# Patient Record
Sex: MALE | Race: WHITE | NOT HISPANIC OR LATINO | Employment: UNEMPLOYED | URBAN - METROPOLITAN AREA
[De-identification: names, ages, dates, MRNs, and addresses within clinical notes are randomized per-mention and may not be internally consistent; named-entity substitution may affect disease eponyms.]

---

## 2017-04-10 ENCOUNTER — ALLSCRIPTS OFFICE VISIT (OUTPATIENT)
Dept: OTHER | Facility: OTHER | Age: 27
End: 2017-04-10

## 2017-04-10 DIAGNOSIS — Z13.29 ENCOUNTER FOR SCREENING FOR OTHER SUSPECTED ENDOCRINE DISORDER: ICD-10-CM

## 2017-04-10 DIAGNOSIS — Z13.220 ENCOUNTER FOR SCREENING FOR LIPOID DISORDERS: ICD-10-CM

## 2017-04-10 DIAGNOSIS — Z13.0 ENCOUNTER FOR SCREENING FOR DISEASES OF THE BLOOD AND BLOOD-FORMING ORGANS AND CERTAIN DISORDERS INVOLVING THE IMMUNE MECHANISM: ICD-10-CM

## 2017-04-10 DIAGNOSIS — Z13.1 ENCOUNTER FOR SCREENING FOR DIABETES MELLITUS: ICD-10-CM

## 2018-01-13 NOTE — PROGRESS NOTES
Assessment    1  Encounter for preventive health examination (V70 0) (Z00 00)   2  Asperger's disorder (299 80) (F84 5)   3  Asthma (493 90) (J45 909)   4  Anxiety (300 00) (F41 9)   5  Screening for diabetes mellitus (V77 1) (Z13 1)   6  Screening for lipid disorders (V77 91) (Z13 220)   7  Screening for hypothyroidism (V77 0) (Z13 29)    Plan  Anxiety, Screening for deficiency anemia, Screening for hypothyroidism    · (1) TSH; Status:Active; Requested for:12Apr2016; Health Maintenance, Screening for deficiency anemia, Screening for lipid disorders    · (1) CBC/PLT/DIFF; Status:Active; Requested for:12Apr2016;   Screening for diabetes mellitus    · (1) COMPREHENSIVE METABOLIC PANEL; Status:Active; Requested for:12Apr2016;   Screening for lipid disorders    · (1) LIPID PANEL, FASTING; Status:Active; Requested for:12Apr2016;     Discussion/Summary  Impression: health maintenance visit  Currently, he eats a healthy diet  Prostate cancer screening: PSA is not indicated  Testicular cancer screening: the risks and benefits of testicular cancer screening were discussed, self testicular exam technique was taught and the patient declines testicular cancer screening  Testing was done today for n/A patient not sexually active  Colorectal cancer screening: colorectal cancer screening is not indicated  Screening lab work includes glucose and lipid profile  The risks and benefits of immunizations were discussed  He was advised to be evaluated by an optometrist and a dentist  Advice and education were given regarding aerobic exercise, calcium supplements, vitamin D supplements, cardiovascular risk reduction, sunscreen use, helmet use and seat belt use  Patient discussion: discussed with the patient, discussed with the patient's family  Chief Complaint  PHYSICAL      History of Present Illness  HM, Adult Male: The patient is being seen for a health maintenance evaluation   The last health maintenance visit was 1 year(s) ago    Social History: Household members include mother and father  He is unmarried  Work status: currently on disability  The patient has never smoked cigarettes  He reports never drinking alcohol  He has never used illicit drugs  General Health: The patient's health since the last visit is described as good  He has regular dental visits  He denies vision problems  He denies hearing loss  Immunizations status: up to date  Lifestyle:  He consumes a diverse and healthy diet  He has weight concerns  He exercises regularly  He does not use tobacco  He denies alcohol use  He denies drug use  Reproductive health:  the patient is not sexually active  Screening:   HPI: Ana Paula is here for PE for his MA50 for services for his asperger's Patient gets services through [facility] Patient will actullay be emplyed by them now      Review of Systems    Constitutional: no fever, not feeling poorly, no chills and not feeling tired  Eyes: no eye pain and no eyesight problems  ENT: no complaints of earache, no hearing loss, no nosebleeds, no nasal discharge, no sore throat, no hoarseness  Cardiovascular: no chest pain, no intermittent leg claudication, no palpitations and no extremity edema  Respiratory: No complaints of shortness of breath, no wheezing, no cough, no SOB on exertion, no orthopnea or PND  Gastrointestinal: No complaints of abdominal pain, no constipation, no nausea or vomiting, no diarrhea or bloody stools  Musculoskeletal: no arthralgias and no myalgias  Integumentary: no rashes  Neurological: No compliants of headache, no confusion, no convulsions, no numbness or tingling, no dizziness or fainting, no limb weakness, no difficulty walking  Psychiatric: no anxiety, no sleep disturbances and no depression  Active Problems    1  Allergic rhinitis (477 9) (J30 9)   2  Anxiety (300 00) (F41 9)   3  Asperger's disorder (299 80) (F84 5)   4  Asthma (493 90) (J61 909)   5   Screening for diabetes mellitus (V77 1) (Z13 1)   6  Screening for lipid disorders (V77 91) (Z13 220)    Past Medical History    · History of Asthma (493 90) (J45 909)   · History of autism spectrum disorder (V11 8) (Z86 59)   · History of depression (V11 8) (Z86 59)   · History of Need for chickenpox vaccination (V05 4) (Z23)   · History of Need for prophylactic vaccination and inoculation against bacterial diseases  (V03 9) (Z23)   · History of Need for vaccination for DTP (V06 1) (Z23)    Surgical History    · History of Hand Incision Tendon Sheath Of A Finger   · History of Myringotomy - With Ventilating Tube Insertion    Family History    · Family history of fibromyalgia (V17 89) (Z82 69)   · Family history of low back pain (V17 89) (Z82 69)   · Family history of systemic lupus erythematosus (V19 4) (Z82 69)    · No pertinent family history    · Family history of myocardial infarction (V17 3) (Z82 49)    · Family history of Charcot's syndrome    Social History    · Denied: Alcohol Use (History)   · Denied: Caffeine Use   · Denied: Drug Use   · Never A Smoker   · Uses Safety Equipment - Seatbelts    Current Meds   1  ALPRAZolam 1 MG Oral Tablet; TAKE ONE TAB EVERY 6 TO 8 HOURS AS NEEDED; Therapy: 20BDU4822 to (Evaluate:2015); Last [id number] Ordered   2  Claritin 10 MG Oral Tablet; TAKE 1 TABLET DAILY; Therapy: (Osbaldo Adam) to Recorded   3  Flintstones Complete 60 MG Oral Tablet Chewable; CHEW AND SWALLOW ONE   TABLET ONE TIME DAILY; Therapy: 73Ske4890 to (Last Rx:33Aph9936)  Requested for: 48Esq8504 Ordered   4  Montelukast Sodium 5 MG Oral Tablet Chewable; Therapy: 27VLW2159 to Recorded   5  Multivital Oral Tablet Chewable; CHEW AND SWALLOW 1 TABLET DAILY; Therapy: 23QTD4350 to (Evaluate:94Bnz8325)  Requested for: 46APZ9358; Last   Rx:[id number] Ordered   6  Singulair 10 MG Oral Tablet; TAKE 1 TABLET DAILY; Therapy: (Osbaldo Adam) to Recorded   7   Ventolin  (90 Base) MCG/ACT Inhalation Aerosol Solution; inhale 2 puffs every 6   hrs as needed for cough and wheezing; Therapy: 09JWB5785 to (Last Rx:27Flj2977) Ordered    Allergies    1  No Known Drug Allergies    2  Pollen    Vitals   Recorded: 12Apr2016 09:57AM   Temperature 97 2 F   Heart Rate 72   Systolic 137   Diastolic 80   Height 5 ft 9 5 in   Weight 180 lb    BMI Calculated 26 2   BSA Calculated 1 99     Physical Exam    Constitutional   General appearance: No acute distress, well appearing and well nourished  Eyes   Conjunctiva and lids: No erythema, swelling or discharge  Pupils and irises: Equal, round, reactive to light  Ears, Nose, Mouth, and Throat   External inspection of ears and nose: Normal     Otoscopic examination: Tympanic membranes translucent with normal light reflex  Canals patent without erythema  Hearing: Normal     Nasal mucosa, septum, and turbinates: Normal without edema or erythema  Lips, teeth, and gums: Normal, good dentition  Oropharynx: Normal with no erythema, edema, exudate or lesions  Neck   Neck: Supple, symmetric, trachea midline, no masses  Thyroid: Normal, no thyromegaly  Pulmonary   Respiratory effort: No increased work of breathing or signs of respiratory distress  Auscultation of lungs: Clear to auscultation  Cardiovascular   Palpation of heart: Normal PMI, no thrills  Carotid pulses: 2+ bilaterally  Abdominal aorta: Normal     Femoral pulses: 2+ bilaterally  Pedal pulses: 2+ bilaterally  Examination of extremities for edema and/or varicosities: Normal     Abdomen   Abdomen: Non-tender, no masses  Liver and spleen: No hepatomegaly or splenomegaly  Lymphatic   Palpation of lymph nodes in neck: No lymphadenopathy  Palpation of lymph nodes in axillae: No lymphadenopathy  Musculoskeletal   Gait and station: Normal     Inspection/palpation of digits and nails: Normal without clubbing or cyanosis      Inspection/palpation of joints, bones, and muscles: Normal     Range of motion: Normal     Stability: Normal     Muscle strength/tone: Normal     Skin   Skin and subcutaneous tissue: Normal without rashes or lesions  Palpation of skin and subcutaneous tissue: Normal turgor  Neurologic   Cranial nerves: Cranial nerves 2-12 intact  Sensation: No sensory loss  Psychiatric   Judgment and insight: Normal     Orientation to person, place and time: Normal     Recent and remote memory: Intact  Mood and affect: Normal        Signatures   Electronically signed by : Miguel Dillon DO;  Apr 12 2016 10:22AM EST                       (Author)

## 2018-01-17 NOTE — PROGRESS NOTES
Assessment    1  Encounter for preventive health examination (V70 0) (Z00 00)   2  Asthma (493 90) (J45 909)   3  Anxiety (300 00) (F41 9)   4  Asperger's disorder (299 80) (F84 5)   5  Screening for deficiency anemia (V78 1) (Z13 0)   6  Diabetes mellitus screening (V77 1) (Z13 1)   7  Screening for hypothyroidism (V77 0) (Z13 29)   8  Screening for lipid disorders (V77 91) (Z13 220)    Plan  Diabetes mellitus screening    · (1) COMPREHENSIVE METABOLIC PANEL; Status:Active; Requested for:14Yun2122; Health Maintenance    · Multi-Vitamin Oral Tablet; Take one tablet daily   · Always use a seat belt and shoulder strap when riding or driving a motor vehicle ;  Status:Complete;   Done: 07DQL0949 03:48PM   · Begin or continue regular aerobic exercise   Gradually work up to at least 3 sessions of 30  minutes of exercise a week ; Status:Complete;   Done: 20CMU2436 03:48PM   · Brush your teeth 3 times a day and floss at least once a day ; Status:Complete;   Done:  74JHC8085 03:48PM   · Decreasing the stress in your life may help your condition improve ; Status:Complete;    Done: 59HFM3320 03:48PM   · Diets that are low in carbohydrates and high in protein are very popular for weight loss ;  Status:Complete;   Done: 77UOJ5709 03:48PM   · Drink plenty of fluids ; Status:Complete;   Done: 46GNQ3131 03:48PM   · Put a smoke detector in your living area to warn you in case of fire ; Status:Complete;    Done: 33KLU9747 03:48PM   · Regular aerobic exercise can help reduce stress ; Status:Complete;   Done: 29WPC2152  03:48PM   · There are many ways to reduce your risk of catching or spreading a sexually transmitted  Infection ; Status:Complete;   Done: 48BYS0403 03:48PM   · Use a sun block product with an SPF of 15 or more ; Status:Complete;   Done:  76IQJ4625 03:48PM   · We recommend routine visits to a dentist ; Status:Complete;   Done: 62XNZ6283 03:48PM   · We recommend that you bring your body mass index down to 26 ; Status:Complete;    Done: 03GBY1487 03:48PM   · Call [phone] if: You have any warning signs of skin cancer ; Status:Complete;    Done: 65DTP4145 03:48PM  Screening for deficiency anemia    · (1) CBC/PLT/DIFF; Status:Active; Requested for:81Ydn7148;   Screening for hypothyroidism    · (1) TSH; Status:Active; Requested for:38Kej3026;   Screening for lipid disorders    · (1) LIPID PANEL, FASTING; Status:Active; Requested for:45Wyj8467;     Discussion/Summary  Impression: health maintenance visit  Currently, he eats a healthy diet and has an adequate exercise regimen  Prostate cancer screening: PSA is not indicated  Testicular cancer screening: the risks and benefits of testicular cancer screening were discussed, self testicular exam technique was taught, the patient declines testicular cancer screening and patient refused exam  Colorectal cancer screening: colorectal cancer screening is not indicated  Screening lab work includes glucose and lipid profile  The immunizations are up to date  He was advised to be evaluated by a dentist       Chief Complaint  PHYSICAL      History of Present Illness  HM, Adult Male: The patient is being seen for a health maintenance evaluation  The last health maintenance visit was 1 year(s) ago  Social History: Household members include mother and father  He is unmarried  Work status: currently on disability  The patient has never smoked cigarettes  He reports never drinking alcohol  He has never used illicit drugs  General Health: The patient's health since the last visit is described as good  He does not have regular dental visits  He denies vision problems  He denies hearing loss  Immunizations status: up to date  Lifestyle:  He consumes a diverse and healthy diet  He has weight concerns  He exercises regularly  He does not use tobacco  He denies alcohol use  He denies drug use  Reproductive health:  the patient is not sexually active     Screening: Prostate cancer screening includes no previous evaluation  Testicular cancer screening includes monthly self testicular examinations  Colorectal cancer screening includes no previous screening  Metabolic screening includes no previous lipid profile, no previous glucose screening and no previous thyroid function test    HPI: Patient is here with his mom for PE form for his MA 50 form Patient has no new concerns He has isues with going to the dentist due to his anxiety and has not been there in awhile Patient never had his labs done also per mom due partly to anxiety He has lorazepam for that anxiety as needed prescribed by the psychiatrist Patient continues to followup with allergist for has allergies and asthma      Review of Systems    Constitutional: No fever or chills, feels well, no tiredness, no recent weight gain or weight loss  Eyes: no eye pain and no eyesight problems  ENT: no complaints of earache, no hearing loss, no nosebleeds, no nasal discharge, no sore throat, no hoarseness  Cardiovascular: No complaints of slow heart rate, no fast heart rate, no chest pain, no palpitations, no leg claudication, no lower extremity  Respiratory: No complaints of shortness of breath, no wheezing, no cough, no SOB on exertion, no orthopnea or PND  Gastrointestinal: No complaints of abdominal pain, no constipation, no nausea or vomiting, no diarrhea or bloody stools  Genitourinary: No complaints of dysuria, no incontinence, no hesitancy, no nocturia, no genital lesion, no testicular pain  Musculoskeletal: No complaints of arthralgia, no myalgias, no joint swelling or stiffness, no limb pain or swelling  Integumentary: no rashes  Neurological: No compliants of headache, no confusion, no convulsions, no numbness or tingling, no dizziness or fainting, no limb weakness, no difficulty walking  Psychiatric: anxiety and stable anxiety, but no sleep disturbances and no depression  Active Problems    1   Allergic rhinitis (477 9) (J30 9)   2  Anxiety (300 00) (F41 9)   3  Asperger's disorder (299 80) (F84 5)   4  Asthma (493 90) (J45 909)   5  Diabetes mellitus screening (V77 1) (Z13 1)   6  Screening for deficiency anemia (V78 1) (Z13 0)   7  Screening for hypothyroidism (V77 0) (Z13 29)   8  Screening for lipid disorders (V77 91) (Z13 220)    Past Medical History    · History of Asthma (493 90) (J45 909)   · History of autism spectrum disorder (V11 8) (Z86 59)   · History of depression (V11 8) (Z86 59)   · History of Need for chickenpox vaccination (V05 4) (Z23)   · History of Need for prophylactic vaccination and inoculation against bacterial diseases  (V03 9) (Z23)   · History of Need for vaccination for DTP (V06 1) (Z23)    Surgical History    · History of Hand Incision Tendon Sheath Of A Finger   · History of Myringotomy - With Ventilating Tube Insertion    Family History  Mother    · Family history of fibromyalgia (V17 89) (Z82 69)   · Family history of low back pain (V17 89) (Z82 69)   · Family history of systemic lupus erythematosus (V19 4) (Z82 69)  Father    · No pertinent family history  Maternal Grandmother    · Family history of myocardial infarction (V17 3) (Z82 49)  Maternal Grandfather    · Family history of Charcot's syndrome    Social History    · Denied: Alcohol Use (History)   · Denied: Caffeine Use   · Denied: Drug Use   · Never A Smoker   · Uses Safety Equipment - Seatbelts    Current Meds   1  ALPRAZolam 1 MG Oral Tablet; TAKE ONE TAB EVERY 6 TO 8 HOURS AS NEEDED; Therapy: 49USE3379 to (Evaluate:04Jun2015); Last IY:[id number] Ordered   2  Claritin 10 MG Oral Tablet; TAKE 1 TABLET DAILY; Therapy: (Fabi John) to Recorded   3  Ventolin  (90 Base) MCG/ACT Inhalation Aerosol Solution; inhale 2 puffs every 6   hrs as needed for cough and wheezing; Therapy: 72VWF8069 to (Last Rx:99Hhw4784) Ordered    Allergies    1  No Known Drug Allergies    2   Pollen    Vitals   Recorded: 97Eql1540 01: 90WI   Systolic 798   Diastolic 82   Weight 661 lb 4 oz   BMI Calculated 25 36   BSA Calculated 1 96     Physical Exam    Constitutional   General appearance: No acute distress, well appearing and well nourished  Head and Face   Head and face: Normal     Palpation of the face and sinuses: No sinus tenderness  Eyes   Conjunctiva and lids: No erythema, swelling or discharge  Pupils and irises: Equal, round, reactive to light  Ears, Nose, Mouth, and Throat   External inspection of ears and nose: Normal     Otoscopic examination: Tympanic membranes translucent with normal light reflex  Canals patent without erythema  Hearing: Normal     Nasal mucosa, septum, and turbinates: Normal without edema or erythema  Lips, teeth, and gums: Normal, good dentition  Oropharynx: Normal with no erythema, edema, exudate or lesions  Neck   Neck: Supple, symmetric, trachea midline, no masses  Thyroid: Normal, no thyromegaly  Pulmonary   Auscultation of lungs: Clear to auscultation  Cardiovascular   Auscultation of heart: Normal rate and rhythm, normal S1 and S2, no murmurs  Carotid pulses: 2+ bilaterally  Abdomen   Abdomen: Non-tender, no masses  Liver and spleen: No hepatomegaly or splenomegaly  Lymphatic   Palpation of lymph nodes in neck: No lymphadenopathy  Musculoskeletal   Gait and station: Normal     Skin   Skin and subcutaneous tissue: Normal without rashes or lesions  Neurologic   Cranial nerves: Cranial nerves 2-12 intact  Coordination: Normal finger to nose and heel to shin  Psychiatric   Judgment and insight: Normal     Orientation to person, place and time: Normal     Recent and remote memory: Intact  Mood and affect: Normal        Signatures   Electronically signed by : Rony Langley DO;  Apr 10 2017  3:48PM EST                       (Author)

## 2018-01-22 VITALS
WEIGHT: 174.25 LBS | BODY MASS INDEX: 25.73 KG/M2 | SYSTOLIC BLOOD PRESSURE: 128 MMHG | DIASTOLIC BLOOD PRESSURE: 82 MMHG

## 2018-04-09 ENCOUNTER — OFFICE VISIT (OUTPATIENT)
Dept: FAMILY MEDICINE CLINIC | Facility: CLINIC | Age: 28
End: 2018-04-09
Payer: MEDICARE

## 2018-04-09 VITALS
BODY MASS INDEX: 27.35 KG/M2 | TEMPERATURE: 97 F | SYSTOLIC BLOOD PRESSURE: 138 MMHG | HEART RATE: 72 BPM | WEIGHT: 185.2 LBS | DIASTOLIC BLOOD PRESSURE: 84 MMHG

## 2018-04-09 DIAGNOSIS — Z13.29 SCREENING FOR THYROID DISORDER: ICD-10-CM

## 2018-04-09 DIAGNOSIS — J30.9 ALLERGIC RHINITIS, UNSPECIFIED SEASONALITY, UNSPECIFIED TRIGGER: ICD-10-CM

## 2018-04-09 DIAGNOSIS — R03.0 ELEVATED BLOOD-PRESSURE READING WITHOUT DIAGNOSIS OF HYPERTENSION: ICD-10-CM

## 2018-04-09 DIAGNOSIS — Z13.220 SCREENING, LIPID: ICD-10-CM

## 2018-04-09 DIAGNOSIS — F41.9 ANXIETY: ICD-10-CM

## 2018-04-09 DIAGNOSIS — Z13.1 SCREENING FOR DIABETES MELLITUS: ICD-10-CM

## 2018-04-09 DIAGNOSIS — J45.20 MILD INTERMITTENT ASTHMA WITHOUT COMPLICATION: ICD-10-CM

## 2018-04-09 DIAGNOSIS — Z00.00 WELL ADULT EXAM: Primary | ICD-10-CM

## 2018-04-09 DIAGNOSIS — F84.5 ASPERGER'S DISORDER: ICD-10-CM

## 2018-04-09 DIAGNOSIS — Z13.0 SCREENING FOR DEFICIENCY ANEMIA: ICD-10-CM

## 2018-04-09 PROCEDURE — 99214 OFFICE O/P EST MOD 30 MIN: CPT | Performed by: FAMILY MEDICINE

## 2018-04-09 RX ORDER — ALBUTEROL SULFATE 90 UG/1
2 AEROSOL, METERED RESPIRATORY (INHALATION) EVERY 6 HOURS PRN
COMMUNITY
Start: 2014-07-02

## 2018-04-09 RX ORDER — ALPRAZOLAM 1 MG/1
1 TABLET ORAL
COMMUNITY

## 2018-04-09 NOTE — ASSESSMENT & PLAN NOTE
Discussed dietary and lifestyle changes Patient will decrease salt He does nto drink alcohol or caffeine I encouraged daily exercise Nurse blood pressure check in 3 months

## 2018-04-09 NOTE — ASSESSMENT & PLAN NOTE
Patient adacel vaccine up to date Recommended yearly flu shto Patient is turning 29 this year I will order screening labs Patient encouraged to exercise and continue healthy diet

## 2018-04-09 NOTE — PATIENT INSTRUCTIONS
Low-Sodium Diet   WHAT YOU NEED TO KNOW:   What is a low-sodium diet? A low-sodium diet limits foods that are high in sodium (salt)  You will need to follow a low-sodium diet if you have high blood pressure, kidney disease, or heart failure  You may also need to follow this diet if you have a condition that is causing your body to retain (hold) extra fluid  You may need to limit the amount of sodium you eat to 1,500 mg  Ask your healthcare provider how much sodium you can have each day  How can I use food labels to choose foods that are low in sodium? Read food labels to find the amount of sodium they contain  The amount of sodium is listed in milligrams (mg)  The % Daily Value (DV) column tells you how much of your daily needs are met by 1 serving of the food for each nutrient listed  Choose foods that have less than 5% of the DV of sodium  These foods are considered low in sodium  Foods that have 20% or more of the DV of sodium are considered high in sodium  Some food labels may also list any of the following terms that tell you about the sodium content in the food:  · Sodium-free:  Less than 5 mg in each serving    · Very low sodium:  35 mg of sodium or less in each serving    · Low sodium:  140 mg of sodium or less in each serving    · Reduced sodium: At least 25% less sodium in each serving than the regular type    · Light in sodium:  50% less sodium in each serving    · Unsalted or no added salt:  No extra salt is added during processing (the food may still contain sodium)  Which foods should I avoid? Salty foods are high in sodium   You should avoid the following:  · Processed foods:      ¨ Mixes for cornbread, biscuits, cake, and pudding     ¨ Instant foods, such as potatoes, cereals, noodles, and rice     ¨ Packaged foods, such as bread stuffing, rice and pasta mixes, snack dip mixes, and macaroni and cheese     ¨ Canned foods, such as canned vegetables, soups, broths, sauces, and vegetable or tomato juice    ¨ Snack foods, such as salted chips, popcorn, pretzels, pork rinds, salted crackers, and salted nuts    ¨ Frozen foods, such as dinners, entrees, vegetables with sauces, and breaded meats    ¨ Sauerkraut, pickled vegetables, and other foods prepared in brine    · Meats and cheeses:      ¨ Smoked or cured meat, such as corned beef, weems, ham, hot dogs, and sausage    ¨ Canned meats or spreads, such as potted meats, sardines, anchovies, and imitation seafood    ¨ Deli or lunch meats, such as bologna, ham, turkey, and roast beef    ¨ Processed cheese, such as American cheese and cheese spreads    · Condiments, sauces, and seasonings:      ¨ Salt (¼ teaspoon of salt contains 575 mg of sodium)    ¨ Seasonings made with salt, such as garlic salt, celery salt, onion salt, and seasoned salt    ¨ Regular soy sauce, barbecue sauce, teriyaki sauce, steak sauce, Worcestershire sauce, and most flavored vinegars    ¨ Canned gravy and mixes     ¨ Regular condiments, such as mustard, ketchup, and salad dressings    ¨ Pickles and olives    ¨ Meat tenderizers and monosodium glutamate (MSG)  Which foods can I include? Read the food label to find the amount of sodium in each serving  · Bread and cereal:  Try to choose breads with less than 80 mg of sodium per serving  ¨ Bread, roll, amber, tortilla, or unsalted crackers  ¨ Ready-to-eat cereals with less than 5% DV of sodium (examples include shredded wheat and puffed rice)    ¨ Pasta    · Vegetables and fruits:      ¨ Unsalted fresh, frozen, or canned vegetables    ¨ Fresh, frozen, or canned fruits    ¨ Fruit juice    · Dairy:  One serving has about 150 mg of sodium  ¨ Milk, all types    ¨ Yogurt    ¨ Hard cheese, such as cheddar, Swiss, [employer], or mozzarella    · Meat and other protein foods:  Some raw meats may have added sodium       ¨ Plain meats, fish, and poultry     ¨ Egg    · Other foods:      ¨ Homemade pudding    ¨ Unsalted nuts, popcorn, or pretzels    ¨ Unsalted butter or margarine  What are some ways that I can decrease sodium? · Add spices and herbs to foods instead of salt during cooking  Use salt-free seasonings to add flavor to foods  Examples include onion powder, garlic powder, basil, de oliveira powder, paprika, and parsley  Try lemon or lime juice or vinegar to give foods a tart flavor  Use hot peppers, pepper, or cayenne pepper to add a spicy flavor to foods  · Do not keep a salt shaker at your kitchen table  This may help keep you from adding salt to food at the table  It may take time to get used to enjoying the natural flavor of food instead of adding salt  Talk to your healthcare provider before you use salt substitutes  Some salt substitutes have a high amount of potassium and need to be avoided if you have kidney disease  · Choose low-sodium foods at restaurants  Meals from restaurants are often high in sodium  Some restaurants have nutrition information on the menu that tells you the amount of sodium in their foods  If possible, ask for your food to be prepared with less, or no salt  · Shop for unsalted or low-sodium foods and snacks at the grocery store  Examples include unsalted or low-sodium broths, soups, and canned vegetables  Choose fresh or frozen vegetables instead  Choose unsalted nuts or seeds or fresh fruits or vegetables as snacks  Read food labels and choose salt-free, very low-sodium, or low-sodium foods  CARE AGREEMENT:   You have the right to help plan your care  Discuss treatment options with your caregivers to decide what care you want to receive  You always have the right to refuse treatment  The above information is an  only  It is not intended as medical advice for individual conditions or treatments  Talk to your doctor, nurse or pharmacist before following any medical regimen to see if it is safe and effective for you    © 2017 [employer] Information is for End User's use only and may not be sold, redistributed or otherwise used for commercial purposes  All illustrations and images included in CareNotes® are the copyrighted property of A D A M [employer] Inc  or [address]  Wellness Visit for Adults   WHAT YOU NEED TO KNOW:   What is a wellness visit? A wellness visit is when you see your healthcare provider to get screened for health problems  You can also get advice on how to stay healthy  Write down your questions so you remember to ask them  Ask your healthcare provider how often you should have a wellness visit  What happens at a wellness visit? Your healthcare provider will ask about your health, and your family history of health problems  This includes high blood pressure, heart disease, and cancer  He or she will ask if you have symptoms that concern you, if you smoke, and about your mood  You may also be asked about your intake of medicines, supplements, food, and alcohol  Any of the following may be done:  · Your weight  will be checked  Your height may also be checked so your body mass index (BMI) can be calculated  Your BMI shows if you are at a healthy weight  · Your blood pressure  and heart rate will be checked  Your temperature may also be checked  · Blood and urine tests  may be done  Blood tests may be done to check your cholesterol levels  Abnormal cholesterol levels increase your risk for heart disease and stroke  You may also need a blood or urine test to check for diabetes if you are at increased risk  Urine tests may be done to look for signs of an infection or kidney disease  · A physical exam  includes checking your heartbeat and lungs with a stethoscope  Your healthcare provider may also check your skin to look for sun damage  · Screening tests  may be recommended  A screening test is done to check for diseases that may not cause symptoms   The screening tests you may need depend on your age, gender, family history, and lifestyle habits  For example, colorectal screening may be recommended if you are 48years old or older  What screening tests do I need if I am a woman? · A Pap smear  is used to screen for cervical cancer  Pap smears are usually done every 3 to 5 years depending on your age  You may need them more often if you have had abnormal Pap smear test results in the past  Ask your healthcare provider how often you should have a Pap smear  · A mammogram  is an x-ray of your breasts to screen for breast cancer  Experts recommend mammograms every 2 years starting at age 48 years  You may need a mammogram at age 52 years or younger if you have an increased risk for breast cancer  Talk to your healthcare provider about when you should start having mammograms and how often you need them  What vaccines might I need? · Get an influenza vaccine  every year  The influenza vaccine protects you from the flu  Several types of viruses cause the flu  The viruses change over time, so new vaccines are made each year  · Get a tetanus-diphtheria (Td) booster vaccine  every 10 years  This vaccine protects you against tetanus and diphtheria  Tetanus is a severe infection that may cause painful muscle spasms and lockjaw  Diphtheria is a severe bacterial infection that causes a thick covering in the back of your mouth and throat  · Get a human papillomavirus (HPV) vaccine  if you are female and aged 23 to 32 or male 23 to 24 and never received it  This vaccine protects you from HPV infection  HPV is the most common infection spread by sexual contact  HPV may also cause vaginal, penile, and anal cancers  · Get a pneumococcal vaccine  if you are aged 72 years or older  The pneumococcal vaccine is an injection given to protect you from pneumococcal disease  Pneumococcal disease is an infection caused by pneumococcal bacteria  The infection may cause pneumonia, meningitis, or an ear infection      · Get a shingles vaccine  if you are aged 61 or older, even if you have had shingles before  The shingles vaccine is an injection to protect you from the varicella-zoster virus  This is the same virus that causes chickenpox  Shingles is a painful rash that develops in people who had chickenpox or have been exposed to the virus  How can I eat healthy? My Plate is a model for planning healthy meals  It shows the types and amounts of foods that should go on your plate  Fruits and vegetables make up about half of your plate, and grains and protein make up the other half  A serving of dairy is included on the side of your plate  The amount of calories and serving sizes you need depends on your age, gender, weight, and height  Examples of healthy foods are listed below:  · Eat a variety of vegetables  such as dark green, red, and orange vegetables  You can also include canned vegetables low in sodium (salt) and frozen vegetables without added butter or sauces  · Eat a variety of fresh fruits , canned fruit in 100% juice, frozen fruit, and dried fruit  · Include whole grains  At least half of the grains you eat should be whole grains  Examples include whole-wheat bread, wheat pasta, brown rice, and whole-grain cereals such as oatmeal     · Eat a variety of protein foods such as seafood (fish and shellfish), lean meat, and poultry without skin (turkey and chicken)  Examples of lean meats include pork leg, shoulder, or tenderloin, and beef round, sirloin, tenderloin, and extra lean ground beef  Other protein foods include eggs and egg substitutes, beans, peas, soy products, nuts, and seeds  · Choose low-fat dairy products such as skim or 1% milk or low-fat yogurt, cheese, and cottage cheese  · Limit unhealthy fats  such as butter, hard margarine, and shortening  How much exercise do I need? Exercise at least 30 minutes per day on most days of the week  Some examples of exercise include walking, biking, dancing, and swimming   You can also fit in more physical activity by taking the stairs instead of the elevator or parking farther away from stores  Include muscle strengthening activities 2 days each week  Regular exercise provides many health benefits  It helps you manage your weight, and decreases your risk for type 2 diabetes, heart disease, stroke, and high blood pressure  Exercise can also help improve your mood  Ask your healthcare provider about the best exercise plan for you  What are some general health and safety guidelines I should follow? · Do not smoke  Nicotine and other chemicals in cigarettes and cigars can cause lung damage  Ask your healthcare provider for information if you currently smoke and need help to quit  E-cigarettes or smokeless tobacco still contain nicotine  Talk to your healthcare provider before you use these products  · Limit alcohol  A drink of alcohol is 12 ounces of beer, 5 ounces of wine, or 1½ ounces of liquor  · Lose weight, if needed  Being overweight increases your risk of certain health conditions  These include heart disease, high blood pressure, type 2 diabetes, and certain types of cancer  · Protect your skin  Do not sunbathe or use tanning beds  Use sunscreen with a SPF 15 or higher  Apply sunscreen at least 15 minutes before you go outside  Reapply sunscreen every 2 hours  Wear protective clothing, hats, and sunglasses when you are outside  · Drive safely  Always wear your seatbelt  Make sure everyone in your car wears a seatbelt  A seatbelt can save your life if you are in an accident  Do not use your cell phone when you are driving  This could distract you and cause an accident  Pull over if you need to make a call or send a text message  · Practice safe sex  Use latex condoms if are sexually active and have more than one partner  Your healthcare provider may recommend screening tests for sexually transmitted infections (STIs)      · Wear helmets, lifejackets, and protective gear   Always wear a helmet when you ride a bike or motorcycle, go skiing, or play sports that could cause a head injury  Wear protective equipment when you play sports  Wear a lifejacket when you are on a boat or doing water sports  CARE AGREEMENT:   You have the right to help plan your care  Learn about your health condition and how it may be treated  Discuss treatment options with your caregivers to decide what care you want to receive  You always have the right to refuse treatment  The above information is an  only  It is not intended as medical advice for individual conditions or treatments  Talk to your doctor, nurse or pharmacist before following any medical regimen to see if it is safe and effective for you  © 2017 [address]  Information is for End User's use only and may not be sold, redistributed or otherwise used for commercial purposes  All illustrations and images included in CareNotes® are the copyrighted property of A D A M , Inc  or [employer]

## 2018-04-09 NOTE — PROGRESS NOTES
Assessment/Plan:    Allergic rhinitis  Allergies stable continue as needed loratidine    Mild intermittent asthma without complication  Asthma is stable  Patient has not needed the inhaler in several months    Asperger's disorder  Patient is stable Patient is getting services from [address] [place], [place] [address]n is stable He take alprazolam for dental visits and sees psychiatry He will continue that    Well adult exam  Patient adacel vaccine up to date Recommended yearly flu shto Patient is turning 29 this year I will order screening labs Patient encouraged to exercise and continue healthy diet    Elevated blood-pressure reading without diagnosis of hypertension  Discussed dietary and lifestyle changes Patient will decrease salt He does nto drink alcohol or caffeine I encouraged daily exercise Nurse blood pressure check in 3 months       Diagnoses and all orders for this visit:    Well adult exam    Asperger's disorder    Anxiety    Mild intermittent asthma without complication    Allergic rhinitis, unspecified seasonality, unspecified trigger    Elevated blood-pressure reading without diagnosis of hypertension    Other orders  -     albuterol (VENTOLIN HFA) 90 mcg/act inhaler; Inhale 2 puffs every 6 (six) hours as needed  -     Multiple Vitamin (MULTI-VITAMIN DAILY PO); Take 1 tablet by mouth daily          Subjective:   Chief Complaint   Patient presents with    Physical Exam          Patient ID: Tammie Maldonado is a 33 y o  male  HPI    The following portions of the patient's history were reviewed and updated as appropriate: allergies, current medications, past social history and problem list     Review of Systems   Constitutional: Negative for fatigue, fever and unexpected weight change  HENT: Negative for congestion, sinus pain and trouble swallowing  Eyes: Negative for discharge and visual disturbance  Respiratory: Negative for cough, chest tightness, shortness of breath and wheezing  Cardiovascular: Negative for chest pain, palpitations and leg swelling  Gastrointestinal: Negative for abdominal pain, blood in stool, constipation, diarrhea, nausea and vomiting  Genitourinary: Negative for difficulty urinating, dysuria, frequency and hematuria  Musculoskeletal: Negative for arthralgias, gait problem and joint swelling  Skin: Negative for rash and wound  Allergic/Immunologic: Negative for environmental allergies and food allergies  Neurological: Negative for dizziness, syncope, weakness, numbness and headaches  Hematological: Negative for adenopathy  Does not bruise/bleed easily  Psychiatric/Behavioral: Negative for confusion, decreased concentration and sleep disturbance  The patient is not nervous/anxious  Objective:      /98   Wt 84 kg (185 lb 3 2 oz)   BMI 27 35 kg/m²          Physical Exam   Constitutional: He is oriented to person, place, and time  He appears well-developed and well-nourished  HENT:   Head: Normocephalic and atraumatic  Right Ear: Hearing, tympanic membrane and external ear normal    Left Ear: Hearing, tympanic membrane and external ear normal    Eyes: Conjunctivae and EOM are normal  Pupils are equal, round, and reactive to light  Neck: Neck supple  No thyromegaly present  Cardiovascular: Normal rate and normal heart sounds  Pulmonary/Chest: Effort normal and breath sounds normal  He has no wheezes  He has no rales  Abdominal: Soft  Bowel sounds are normal  He exhibits no distension  There is no tenderness  Musculoskeletal: He exhibits no edema or tenderness  Lymphadenopathy:     He has no cervical adenopathy  Neurological: He is alert and oriented to person, place, and time  No cranial nerve deficit  Coordination normal    Skin: Skin is warm and dry  No rash noted  Psychiatric: He has a normal mood and affect   His behavior is normal  Judgment and thought content normal

## 2019-04-01 ENCOUNTER — OFFICE VISIT (OUTPATIENT)
Dept: FAMILY MEDICINE CLINIC | Facility: CLINIC | Age: 29
End: 2019-04-01
Payer: MEDICARE

## 2019-04-01 VITALS
HEIGHT: 70 IN | BODY MASS INDEX: 28.2 KG/M2 | SYSTOLIC BLOOD PRESSURE: 124 MMHG | DIASTOLIC BLOOD PRESSURE: 82 MMHG | WEIGHT: 197 LBS

## 2019-04-01 DIAGNOSIS — F41.9 ANXIETY: ICD-10-CM

## 2019-04-01 DIAGNOSIS — E66.3 OVERWEIGHT (BMI 25.0-29.9): ICD-10-CM

## 2019-04-01 DIAGNOSIS — Z13.1 SCREENING FOR DIABETES MELLITUS: ICD-10-CM

## 2019-04-01 DIAGNOSIS — Z00.00 ANNUAL PHYSICAL EXAM: Primary | ICD-10-CM

## 2019-04-01 DIAGNOSIS — J45.20 MILD INTERMITTENT ASTHMA WITHOUT COMPLICATION: ICD-10-CM

## 2019-04-01 DIAGNOSIS — F84.5 ASPERGER'S DISORDER: ICD-10-CM

## 2019-04-01 DIAGNOSIS — Z13.6 SCREENING FOR CARDIOVASCULAR CONDITION: ICD-10-CM

## 2019-04-01 PROBLEM — R03.0 ELEVATED BLOOD-PRESSURE READING WITHOUT DIAGNOSIS OF HYPERTENSION: Status: RESOLVED | Noted: 2018-04-09 | Resolved: 2019-04-01

## 2019-04-01 PROCEDURE — 99214 OFFICE O/P EST MOD 30 MIN: CPT | Performed by: FAMILY MEDICINE

## 2020-07-21 ENCOUNTER — OFFICE VISIT (OUTPATIENT)
Dept: FAMILY MEDICINE CLINIC | Facility: CLINIC | Age: 30
End: 2020-07-21
Payer: MEDICARE

## 2020-07-21 VITALS
DIASTOLIC BLOOD PRESSURE: 82 MMHG | TEMPERATURE: 98 F | BODY MASS INDEX: 27.97 KG/M2 | SYSTOLIC BLOOD PRESSURE: 142 MMHG | WEIGHT: 195.4 LBS | HEIGHT: 70 IN

## 2020-07-21 DIAGNOSIS — J45.20 MILD INTERMITTENT ASTHMA WITHOUT COMPLICATION: ICD-10-CM

## 2020-07-21 DIAGNOSIS — R03.0 ELEVATED BLOOD PRESSURE READING WITHOUT DIAGNOSIS OF HYPERTENSION: ICD-10-CM

## 2020-07-21 DIAGNOSIS — F84.5 ASPERGER'S DISORDER: Primary | ICD-10-CM

## 2020-07-21 DIAGNOSIS — F41.9 ANXIETY: ICD-10-CM

## 2020-07-21 DIAGNOSIS — E66.3 OVERWEIGHT: ICD-10-CM

## 2020-07-21 PROCEDURE — 3008F BODY MASS INDEX DOCD: CPT | Performed by: FAMILY MEDICINE

## 2020-07-21 PROCEDURE — 1036F TOBACCO NON-USER: CPT | Performed by: FAMILY MEDICINE

## 2020-07-21 PROCEDURE — 99214 OFFICE O/P EST MOD 30 MIN: CPT | Performed by: FAMILY MEDICINE

## 2020-07-21 NOTE — PATIENT INSTRUCTIONS

## 2020-07-21 NOTE — PROGRESS NOTES
Assessment/Plan:    Asperger's disorder  Continue itwh current progarm    Anxiety  Continue with psychiatry    Mild intermittent asthma without complication  No use of rescue inhaler in one year    Overweight  Check lipids and cmp  Discussed diet and exercise    Elevated blood pressure reading without diagnosis of hypertension  Discussed diet and exercise recheck in 6 months       Diagnoses and all orders for this visit:    Asperger's disorder    Anxiety    Mild intermittent asthma without complication    Overweight    Elevated blood pressure reading without diagnosis of hypertension          Subjective:   Chief Complaint   Patient presents with    Annual Exam          Patient ID: Car Ambriz is a 31 y o  male  HPI    The following portions of the patient's history were reviewed and updated as appropriate: allergies, current medications, past family history, past medical history, past social history, past surgical history and problem list     Review of Systems   Constitutional: Negative for fatigue, fever and unexpected weight change  HENT: Negative for congestion, sinus pain and trouble swallowing  Eyes: Negative for discharge and visual disturbance  Respiratory: Negative for cough, chest tightness, shortness of breath and wheezing  Cardiovascular: Negative for chest pain, palpitations and leg swelling  Gastrointestinal: Negative for abdominal pain, blood in stool, constipation, diarrhea, nausea and vomiting  Genitourinary: Negative for difficulty urinating, dysuria, frequency and hematuria  Musculoskeletal: Negative for arthralgias, gait problem and joint swelling  Skin: Negative for rash and wound  Allergic/Immunologic: Negative for environmental allergies and food allergies  Neurological: Negative for dizziness, syncope, weakness, numbness and headaches  Hematological: Negative for adenopathy  Does not bruise/bleed easily     Psychiatric/Behavioral: Negative for confusion, decreased concentration and sleep disturbance  The patient is not nervous/anxious  Objective:      /82   Temp 98 °F (36 7 °C)   Ht 5' 10" (1 778 m)   Wt 88 6 kg (195 lb 6 4 oz)   BMI 28 04 kg/m²          Physical Exam   Constitutional: He is oriented to person, place, and time  He appears well-developed and well-nourished  HENT:   Head: Normocephalic and atraumatic  Right Ear: Hearing, tympanic membrane and external ear normal    Left Ear: Hearing, tympanic membrane and external ear normal    Eyes: Pupils are equal, round, and reactive to light  Conjunctivae and EOM are normal    Neck: Neck supple  No thyromegaly present  Cardiovascular: Normal rate and normal heart sounds  Pulmonary/Chest: Effort normal and breath sounds normal  He has no wheezes  He has no rales  Abdominal: Soft  Bowel sounds are normal  He exhibits no distension  There is no tenderness  Musculoskeletal: He exhibits no edema or tenderness  Lymphadenopathy:     He has no cervical adenopathy  Neurological: He is alert and oriented to person, place, and time  No cranial nerve deficit  Coordination normal    Skin: Skin is warm and dry  No rash noted  Psychiatric: He has a normal mood and affect  His behavior is normal  Judgment and thought content normal    Nursing note and vitals reviewed  BMI Counseling: Body mass index is 28 04 kg/m²  The BMI is above normal  Nutrition recommendations include reducing portion sizes, decreasing overall calorie intake, 3-5 servings of fruits/vegetables daily, moderation in carbohydrate intake and increasing intake of lean protein  Exercise recommendations include exercising 3-5 times per week

## 2025-06-18 ENCOUNTER — TELEPHONE (OUTPATIENT)
Dept: FAMILY MEDICINE CLINIC | Facility: CLINIC | Age: 35
End: 2025-06-18

## 2025-06-18 NOTE — TELEPHONE ENCOUNTER
LOV 2020. Called and left message to schedule/update pcp.     [facility] letter sent/ Please remove Primary Care Provider

## 2025-06-18 NOTE — LETTER
[facility]  [address]  [address]  [place] [id number] [phone]    Date: 06/18/25    Farrukh Larson  [address] [place] [address]    Dear Farrukh:    Your primary care provider, Serina Jones DO, [facility], is committed to providing you with quality health care and we consider it a privilege to be your health care provider.  We have not seen you in the office since 2020 and have been attempting to contact you to schedule your annual physical.  Your primary care provider wants to ensure your ongoing medical care is being addressed.  Please call the office [phone] to re-establish care and schedule your annual physical today.  Yearly physicals are a key component in maintaining good health.  If you have established care with a different primary care provider, please call our office to notify us of this change.  We encourage you to call the number on the back of your insurance card to change your primary care physician with your insurance company as well.   We thank you for choosing [facility] for your healthcare needs.  Sincerely,  Kelin Ventura  [facility]  [phone]

## 2025-06-27 NOTE — TELEPHONE ENCOUNTER
06/26/25 10:50 PM     The office's request has been received and reviewed.    The PCP has been successfully removed with a patient attribution note.     This message will now be completed.    Thank you  Maria Guadalupe Patricio